# Patient Record
Sex: MALE | Race: WHITE | NOT HISPANIC OR LATINO | Employment: FULL TIME | URBAN - METROPOLITAN AREA
[De-identification: names, ages, dates, MRNs, and addresses within clinical notes are randomized per-mention and may not be internally consistent; named-entity substitution may affect disease eponyms.]

---

## 2017-04-17 ENCOUNTER — OFFICE VISIT - RIVER FALLS (OUTPATIENT)
Dept: FAMILY MEDICINE | Facility: CLINIC | Age: 42
End: 2017-04-17

## 2018-05-04 ENCOUNTER — OFFICE VISIT - RIVER FALLS (OUTPATIENT)
Dept: FAMILY MEDICINE | Facility: CLINIC | Age: 43
End: 2018-05-04

## 2018-05-04 ASSESSMENT — MIFFLIN-ST. JEOR: SCORE: 2001.63

## 2018-07-07 ENCOUNTER — OFFICE VISIT - RIVER FALLS (OUTPATIENT)
Dept: FAMILY MEDICINE | Facility: CLINIC | Age: 43
End: 2018-07-07

## 2022-02-11 VITALS
BODY MASS INDEX: 35.36 KG/M2 | HEIGHT: 70 IN | TEMPERATURE: 98.6 F | OXYGEN SATURATION: 96 % | SYSTOLIC BLOOD PRESSURE: 110 MMHG | WEIGHT: 247 LBS | DIASTOLIC BLOOD PRESSURE: 68 MMHG | HEART RATE: 77 BPM

## 2022-02-11 VITALS
TEMPERATURE: 97.8 F | BODY MASS INDEX: 34.12 KG/M2 | WEIGHT: 237.8 LBS | HEART RATE: 88 BPM | SYSTOLIC BLOOD PRESSURE: 152 MMHG | DIASTOLIC BLOOD PRESSURE: 82 MMHG

## 2022-02-12 VITALS
BODY MASS INDEX: 34.75 KG/M2 | WEIGHT: 242.2 LBS | DIASTOLIC BLOOD PRESSURE: 88 MMHG | SYSTOLIC BLOOD PRESSURE: 122 MMHG | TEMPERATURE: 97.3 F | HEART RATE: 80 BPM

## 2022-02-13 ENCOUNTER — APPOINTMENT (OUTPATIENT)
Dept: ULTRASOUND IMAGING | Facility: CLINIC | Age: 47
End: 2022-02-13
Attending: EMERGENCY MEDICINE
Payer: COMMERCIAL

## 2022-02-13 ENCOUNTER — APPOINTMENT (OUTPATIENT)
Dept: GENERAL RADIOLOGY | Facility: CLINIC | Age: 47
End: 2022-02-13
Attending: EMERGENCY MEDICINE
Payer: COMMERCIAL

## 2022-02-13 ENCOUNTER — HOSPITAL ENCOUNTER (EMERGENCY)
Facility: CLINIC | Age: 47
Discharge: HOME | End: 2022-02-14
Attending: EMERGENCY MEDICINE | Admitting: EMERGENCY MEDICINE
Payer: COMMERCIAL

## 2022-02-13 DIAGNOSIS — R07.9 CHEST PAIN, UNSPECIFIED TYPE: ICD-10-CM

## 2022-02-13 LAB
ALBUMIN SERPL-MCNC: 3.7 G/DL (ref 3.4–5)
ALP SERPL-CCNC: 92 U/L (ref 40–150)
ALT SERPL W P-5'-P-CCNC: 59 U/L (ref 0–70)
ANION GAP SERPL CALCULATED.3IONS-SCNC: 8 MMOL/L (ref 3–14)
AST SERPL W P-5'-P-CCNC: 43 U/L (ref 0–45)
BASOPHILS # BLD AUTO: 0.1 10E3/UL (ref 0–0.2)
BASOPHILS NFR BLD AUTO: 1 %
BILIRUB SERPL-MCNC: 0.3 MG/DL (ref 0.2–1.3)
BUN SERPL-MCNC: 13 MG/DL (ref 7–30)
CALCIUM SERPL-MCNC: 9.2 MG/DL (ref 8.5–10.1)
CHLORIDE BLD-SCNC: 102 MMOL/L (ref 94–109)
CO2 SERPL-SCNC: 26 MMOL/L (ref 20–32)
CREAT SERPL-MCNC: 0.8 MG/DL (ref 0.66–1.25)
EOSINOPHIL # BLD AUTO: 0.1 10E3/UL (ref 0–0.7)
EOSINOPHIL NFR BLD AUTO: 1 %
ERYTHROCYTE [DISTWIDTH] IN BLOOD BY AUTOMATED COUNT: 12.4 % (ref 10–15)
GFR SERPL CREATININE-BSD FRML MDRD: >90 ML/MIN/1.73M2
GLUCOSE BLD-MCNC: 174 MG/DL (ref 70–99)
HCT VFR BLD AUTO: 46.9 % (ref 40–53)
HGB BLD-MCNC: 16.2 G/DL (ref 13.3–17.7)
HOLD SPECIMEN: NORMAL
IMM GRANULOCYTES # BLD: 0.1 10E3/UL
IMM GRANULOCYTES NFR BLD: 1 %
LIPASE SERPL-CCNC: 128 U/L (ref 73–393)
LYMPHOCYTES # BLD AUTO: 2.8 10E3/UL (ref 0.8–5.3)
LYMPHOCYTES NFR BLD AUTO: 25 %
MCH RBC QN AUTO: 30.9 PG (ref 26.5–33)
MCHC RBC AUTO-ENTMCNC: 34.5 G/DL (ref 31.5–36.5)
MCV RBC AUTO: 90 FL (ref 78–100)
MONOCYTES # BLD AUTO: 0.6 10E3/UL (ref 0–1.3)
MONOCYTES NFR BLD AUTO: 6 %
NEUTROPHILS # BLD AUTO: 7.4 10E3/UL (ref 1.6–8.3)
NEUTROPHILS NFR BLD AUTO: 66 %
NRBC # BLD AUTO: 0 10E3/UL
NRBC BLD AUTO-RTO: 0 /100
PLATELET # BLD AUTO: 271 10E3/UL (ref 150–450)
POTASSIUM BLD-SCNC: 3.8 MMOL/L (ref 3.4–5.3)
PROT SERPL-MCNC: 8.1 G/DL (ref 6.8–8.8)
RBC # BLD AUTO: 5.24 10E6/UL (ref 4.4–5.9)
SODIUM SERPL-SCNC: 136 MMOL/L (ref 133–144)
TROPONIN I SERPL HS-MCNC: 6 NG/L
WBC # BLD AUTO: 11 10E3/UL (ref 4–11)

## 2022-02-13 PROCEDURE — 76705 ECHO EXAM OF ABDOMEN: CPT

## 2022-02-13 PROCEDURE — 36415 COLL VENOUS BLD VENIPUNCTURE: CPT | Performed by: EMERGENCY MEDICINE

## 2022-02-13 PROCEDURE — 82040 ASSAY OF SERUM ALBUMIN: CPT | Performed by: EMERGENCY MEDICINE

## 2022-02-13 PROCEDURE — 93005 ELECTROCARDIOGRAM TRACING: CPT

## 2022-02-13 PROCEDURE — 80053 COMPREHEN METABOLIC PANEL: CPT | Performed by: EMERGENCY MEDICINE

## 2022-02-13 PROCEDURE — 83690 ASSAY OF LIPASE: CPT | Performed by: EMERGENCY MEDICINE

## 2022-02-13 PROCEDURE — 71046 X-RAY EXAM CHEST 2 VIEWS: CPT

## 2022-02-13 PROCEDURE — 85025 COMPLETE CBC W/AUTO DIFF WBC: CPT | Performed by: EMERGENCY MEDICINE

## 2022-02-13 PROCEDURE — 99285 EMERGENCY DEPT VISIT HI MDM: CPT | Mod: 25

## 2022-02-13 PROCEDURE — 84484 ASSAY OF TROPONIN QUANT: CPT | Performed by: EMERGENCY MEDICINE

## 2022-02-13 ASSESSMENT — MIFFLIN-ST. JEOR: SCORE: 2092.35

## 2022-02-14 VITALS
WEIGHT: 260 LBS | OXYGEN SATURATION: 94 % | TEMPERATURE: 97.8 F | BODY MASS INDEX: 35.21 KG/M2 | HEART RATE: 90 BPM | DIASTOLIC BLOOD PRESSURE: 92 MMHG | HEIGHT: 72 IN | SYSTOLIC BLOOD PRESSURE: 146 MMHG | RESPIRATION RATE: 23 BRPM

## 2022-02-14 LAB
ATRIAL RATE - MUSE: 111 BPM
DIASTOLIC BLOOD PRESSURE - MUSE: NORMAL MMHG
INTERPRETATION ECG - MUSE: NORMAL
P AXIS - MUSE: 51 DEGREES
PR INTERVAL - MUSE: 134 MS
QRS DURATION - MUSE: 72 MS
QT - MUSE: 320 MS
QTC - MUSE: 435 MS
R AXIS - MUSE: 46 DEGREES
SYSTOLIC BLOOD PRESSURE - MUSE: NORMAL MMHG
T AXIS - MUSE: 99 DEGREES
VENTRICULAR RATE- MUSE: 111 BPM

## 2022-02-14 NOTE — ED PROVIDER NOTES
"  History   Chief Complaint:  Chest Pain       The history is provided by the patient.   History supplemented by electronic chart review     Lang Vasquez is a 46 year old male who presents with chest pain. The patient reports that he has been experiencing midsternal chest pain for \"a while.\" His pain is reportedly intermittent, with episodes lasting about half an hour. He states that eating is a trigger for his symptoms. The patient also notes that he has been coughing intermittently \"for weeks.\" Of note, the patient reports no history of gallbladder issues. He denies any use of antacids. He states that he drinks alcohol \"every once in a while,\" but not daily. He also notes that he has a cigarette \"every once in a while, but not full-time.\" At this time, he endorses no pain. He wonders whether he might have cancer. He would also like to make sure he is not having any heart issue. Symptoms are not exertional. No prior diagnosis of heart or lung problems. He does not take antacids. He told his coworkers about his symptoms tonight and encouraged him to come to the hospital to get checked out.    Review of Systems   All other systems reviewed and are negative.    Allergies:  No Known Drug Allergies     Medications:  The patient is currently on no regular medications.     Past Medical History:     The patient denies past medical history including gallbladder issues.       Past Surgical History:    The patient denies past surgical history.     Family History:    The patient denies past family history.     Social History:  Presents to the emergency department alone   Arrives via car   [place]    Physical Exam     Patient Vitals for the past 24 hrs:   BP Temp Temp src Pulse Resp SpO2 Height Weight   02/14/22 0020 (!) 146/92 -- -- 90 23 94 % -- --   02/13/22 2320 (!) 153/97 -- -- 98 21 94 % -- --   02/13/22 2208 (!) 166/101 97.8  F (36.6  C) Temporal 115 16 98 % 1.829 m (6') 117.9 kg (260 " lb)     Physical Exam  General: Nontoxic-appearing male sitting upright in room 1  HENT: mucous membranes moist  CV: rate as above, regular rhythm, no lower extremity edema, no JVD, palpable symmetric radial pulses  Resp: normal effort, speaks in full phrases, no stridor, no cough observed  GI: abdomen soft and nontender, no guarding, negative Barger's sign  MSK: no bony tenderness to chest  Skin: appropriately warm and dry, no erythema or vesicles to chest wall  Neuro: alert, clear speech, oriented  Psych: cooperative    Emergency Department Course   ECG  ECG obtained at 2203, ECG read at 2230  Sinus tachycardia  Possible Left atrial enlargement  Nonspecific ST and T wave abnormality   Rate 111 bpm. NM interval 134 ms. QRS duration 72 ms. QT/QTc 320/435 ms. P-R-T axes 51 46 99.     Imaging:  US Abdomen Limited   Final Result   IMPRESSION:   1.  No sonographic evidence of cholelithiasis or cholecystitis.   2.  Enlarged liver with steatosis.            XR Chest 2 Views   Final Result   IMPRESSION: No evidence of active cardiopulmonary disease.         Report per radiology    Laboratory:  Labs Ordered and Resulted from Time of ED Arrival to Time of ED Departure   COMPREHENSIVE METABOLIC PANEL - Abnormal       Result Value    Sodium 136      Potassium 3.8      Chloride 102      Carbon Dioxide (CO2) 26      Anion Gap 8      Urea Nitrogen 13      Creatinine 0.80      Calcium 9.2      Glucose 174 (*)     Alkaline Phosphatase 92      AST 43      ALT 59      Protein Total 8.1      Albumin 3.7      Bilirubin Total 0.3      GFR Estimate >90     TROPONIN I - Normal    Troponin I High Sensitivity 6     LIPASE - Normal    Lipase 128     CBC WITH PLATELETS AND DIFFERENTIAL    WBC Count 11.0      RBC Count 5.24      Hemoglobin 16.2      Hematocrit 46.9      MCV 90      MCH 30.9      MCHC 34.5      RDW 12.4      Platelet Count 271      % Neutrophils 66      % Lymphocytes 25      % Monocytes 6      % Eosinophils 1      % Basophils 1       % Immature Granulocytes 1      NRBCs per 100 WBC 0      Absolute Neutrophils 7.4      Absolute Lymphocytes 2.8      Absolute Monocytes 0.6      Absolute Eosinophils 0.1      Absolute Basophils 0.1      Absolute Immature Granulocytes 0.1      Absolute NRBCs 0.0          Emergency Department Course:     Reviewed:  I reviewed nursing notes and vitals    Assessments:  2230 I obtained history and examined the patient as noted above.     0055 I rechecked the patient and explained findings.     Interventions:  GI Cocktail PO    Disposition:  The patient was discharged to home.     Impression & Plan   Medical Decision Making:  The fact that his discomfort is provoked with eating does raise significant suspicion for an upper GI cause such as esophagitis, gastritis, reflux, but also considered biliary colic, pancreatitis, acute coronary syndrome, pancreatitis, aortic disease and others. His evaluation here is reassuring, without evidence of any immediate life-threatening process. I do not think he requires further cardiac testing at this time. We discussed the rationale for initiation of an antacid as well as follow-up through primary care, and he may ultimately benefit from GI evaluation if symptoms persist. Return for sudden worsening at any hour.    Diagnosis:    ICD-10-CM    1. Chest pain, unspecified type  R07.9        Discharge Medications:  New Prescriptions    OMEPRAZOLE (PRILOSEC) 20 MG DR CAPSULE    Take 1 capsule (20 mg) by mouth daily       Scribe Disclosure:  I, Leonardo Blair, am serving as a scribe at 10:29 PM on 2/13/2022 to document services personally performed by Vaibhav Abraham MD based on my observations and the provider's statements to me.   This note was completed in part using Dragon voice recognition software. Although reviewed after completion, some word and grammatical errors may occur.    This note was completed in part using Dragon voice recognition software. Although reviewed after  completion, some word and grammatical errors may occur.             Vaibhav Abraham MD  02/14/22 0257

## 2022-02-14 NOTE — ED TRIAGE NOTES
pt reports intermittent midsternal burning sensation in chest x1 month. pain worse after eating tonight while at work. denies sob.

## 2022-02-16 NOTE — PROGRESS NOTES
Chief Complaint      Patient is here for irritation to right eye. States he poked himself in the eye with a ruler yesterday. Has gotten worse over the night.      History of Present Illness      Chief complaint as above reviewed and confirmed with patient.  Pt presents to the clinic with concerns re: R eye pain.  He accidentally hit it with the metal edge of a ruler.  He has pain, photophobia, watering and redness. no fevers.  he is UTD on tetanus immunization. denies vision change.      Review of Systems      Review of systems is negative with the exception of those noted in HPI              Physical Exam         Vitals & Measurements        T: 97.3   F (Tympanic)  HR: 80(Peripheral)  BP: 122/88         WT: 242.2 lb       exam of the R eye reveals conjunctiva to be injected and watering.  mild conjunctival edema (pt reports rubbing a lot).  perrl, EOMI.  Proparacaine drop placed      flouracine dye placed.  there is increased uptake centrally about a 2 mm area. about 9 :00 position R lateral of the pupil       lid unremarkable.              Assessment/Plan      Corneal abrasion (S05.00XA)         polytrim, eye rest, dark room and sun glasses.  I would like to recheck in 2 days given location of abrasion. if this results in scaring  it is in field of vision.  Will check [facility] today and at time of recheck as well.  pt agreeable. if problems with resolving or recurrent ulceration will refer to opthamology.        Orders:      14124 office outpatient visit 15 minutes (Charge), Quantity: 1, Corneal abrasion             Orders:        polymyxin B-trimethoprim ophthalmic, 1 drop(s), Both eyes, q3hr, # 10 mL, 0 Refill(s), Type: Maintenance, Pharmacy: [id number], 1 drop(s) Eye-Both q3 hr (int),x10 day(s), (Ordered)        Return to Clinic (Request), RFV: Eye recheck per MELVI RT Date 07/09/18      Patient Information      Name:  INA TRAMMELL      Address:       [address] [place] [address]  [address]      Sex: Male      YOB: 1975      Phone: [phone]      Emergency Contact: MANUELITO VASQUEZ      MRN: [id number]      FIN: [id number]      Location: [facility]      Date of Service: 07/07/2018      Primary Care Physician:       Jovanni Rea MD, [phone]      Attending Physician:       Mya Cao PA-C, [phone]      Problem List/Past Medical History      Ongoing       Depression       Mild sleep apnea       Obesity      Historical        *Hospitalized@[facility] - Alcohol use disorder, unspecified depressive disorder      Procedure/Surgical History        Polysomnogram - HST (06/08/2016)           Comments:        AHI of 7.7 and RDI of 12.3      Medications        AutoCPAP +4/15 cm H2O: See Instructions, Heated humidifier, heated tubing, filters, nasal or full face mask of choice with headgear.  Replacement cushions and supplies as needed.  Months = 99 (Lifetime), 1 EA, 0 Refill(s).        Polytrim 10,000 units-1 mg/mL ophthalmic solution: 1 drop(s), Both eyes, q3hr, for 10 day(s), 10 mL, 0 Refill(s).                    Allergies      No known allergies      Social History       Smoking Status - 07/07/2018        Never smoker      Alcohol       Current, 3-5 times per week, 6 drinks/episode average., 07/15/2015      Employment and Education       Employed, Work/School description: ., 07/15/2015      Exercise and Physical Activity       Exercise frequency: 3-4 times/week., 07/15/2015      Home and Environment       Marital status: . Spouse/Partner name: Manuelito Vasquez., 07/15/2015      Nutrition and Health       Type of diet: Regular., 07/15/2015      Sexual       Sexually active: Yes. Sexual orientation: Heterosexual., 07/15/2015      Substance Abuse       Never, 07/15/2015      Tobacco       Never, 07/15/2015      Immunizations           Vaccine           Date           Status           Comments            tetanus/diphth/pertuss (Tdap) adult/adol          07/09/2011          Recorded           Td          01/01/2008          Recorded          [1/28/2011] Month and day unknown.  Signature Line  Signed and Authored by Mya Cao PA-C on 07/08/2018 10:33 PM CDT         Charted Date:      July 08, 2018 10:33 PM CDT      Subject / Title:      corneal abrasion      Performed By:      Mya Cao PA-C on July 08, 2018 10:33 PM CDT      Electronically Signed By:      Mya Cao PA-C on July 08, 2018 10:33 PM CDT      Visit Information:      [id number], [facility], Outpatient, 7/7/2018 - 7/7/2018

## 2022-02-16 NOTE — PROGRESS NOTES
Patient:   INA TRAMMELL            MRN: [id number]            FIN: [id number]               Age:   41 years     Sex:  Male     :  1975   Associated Diagnoses:   Mild sleep apnea   Author:   Jovanni Rea MD      Chief Complaint   2017 4:26 PM CDT    f/u sleep study. Pt did not follow through with CPAP order.      History of Present Illness   see chief complaint as noted above and confirmed with the patient     41 year old male here today to discuss c-pap machine. He was seen  for follow up with sleep study result's, he did get an order put in for a c-pap but at the time he did not get set up for it, he is willing to try a c-pap machine and feels it will help him to get restorative sleep and feel alert and awake throughout the day. He had his original home sleep study done on 2016, he did this test due to daytime sleepiness, witnessed apneas, and waking up often throughout the night. His AHI was 7.7 with RDI of 12.3 with marked daytime sleepiness, mild sleep apnea.       Review of Systems   Constitutional:  Fatigue, Decreased activity.    Ear/Nose/Mouth/Throat:  No nasal congestion.    Respiratory:  No shortness of breath, No cough.    Gastrointestinal:  No nausea, No vomiting.    Genitourinary:  No dysuria.    Musculoskeletal:  No trauma.    Integumentary:  No rash.             Health Status   Allergies:    Allergic Reactions (Selected)  No known allergies   Medications:  (Selected)   Prescriptions  Prescribed  AutoCPAP +4/15 cm H2O: AutoCPAP +4/15 cm H2O, See Instructions, Instructions: Heated humidifier, heated tubing, filters, nasal or full face mask of choice with headgear.  Replacement cushions and supplies as needed.  Months = 99 (Lifetime), Supply, # 1 EA, 0 Refill(s), Type...  buPROPion 150 mg/24 hours (XL) oral tablet, extended release: 1 tab(s) ( 150 mg ), po, q 24 hrs, # 90 tab(s), 1 Refill(s), Type: Maintenance, Pharmacy: [id number], 1 tab(s) po q 24 hrs    Problem list:    All Problems  Obesity / SNOMED CT 2809778563 / Probable  Depression / SNOMED CT 725062047 / Confirmed  Mild sleep apnea / SNOMED CT 126966530 / Confirmed  Resolved: *Hospitalized@[facility] - Alcohol use disorder, unspecified depressive disorder  Canceled: Ear Pain / ICD-9-.70      Histories   Past Medical History:    Resolved  *Hospitalized@[facility] - Alcohol use disorder, unspecified depressive disorder: Onset on 4/16/2016 at 40 years.  Resolved on 5/10/2016 at 40 years.   Family History:    No family history items have been selected or recorded.   Procedure history:    Polysomnogram - HST on 6/8/2016 at 40 Years.  Comments:  7/6/2016 1:49 PM - Ashley Quarles CMA  AHI of 7.7 and RDI of 12.3   Social History:        Alcohol Assessment            Current, 3-5 times per week, 6 drinks/episode average.      Tobacco Assessment            Never      Substance Abuse Assessment            Never      Employment and Education Assessment            Employed, Work/School description: .      Home and Environment Assessment            Marital status: .  Spouse/Partner name: Ann Vsaquez.      Nutrition and Health Assessment            Type of diet: Regular.      Exercise and Physical Activity Assessment            Exercise frequency: 3-4 times/week.      Sexual Assessment            Sexually active: Yes.  Sexual orientation: Heterosexual.        Physical Examination   Vital Signs   4/17/2017 4:26 PM CDT Temperature Tympanic 97.8 DegF  LOW    Peripheral Pulse Rate 88 bpm    Pulse Site Radial artery    HR Method Manual    Systolic Blood Pressure 152 mmHg  HI    Diastolic Blood Pressure 82 mmHg    Mean Arterial Pressure 105 mmHg    BP Site Right arm    BP Method Manual      Measurements from flowsheet : Measurements   4/17/2017 4:26 PM CDT    Weight Measured - Standard                237.8 lb     General:  Alert and oriented, No acute distress.    Eye:  Pupils are equal,  round and reactive to light, Normal conjunctiva.    HENT:  Oral mucosa is moist.    Neck:  Supple, No lymphadenopathy.    Respiratory:  Respirations are non-labored.    Cardiovascular:  Normal rate, Regular rhythm, No edema.    Musculoskeletal:  Normal gait.    Integumentary:  Warm, No rash.    Neurologic:  Alert, Oriented.    Psychiatric:  Cooperative, Appropriate mood & affect, Normal judgment.       Review / Management   Results review      Impression and Plan       Diagnosis     Mild sleep apnea (SJV14-PK G47.30).     Course:  Progressing as expected, reviewed what stopped him before from proceeding and what his motivation is now  reviewed his study finding and its significance  reviewed overall effects of MT as well as goals of therapy and obstacles to therapy  seems motivated to use  continue download and follow up.    Plan:  Order for c-pap in chart     Set up with [facility] who will give you a machine with mask and supplies    Follow up in two months to see effectivness of therapy.     Please return sooner if needed. .    Chaya MCARTHUR Medical Assistant acted solely as a scribe for, and in presence of Dr. Jovanni Rea who performed the services.

## 2022-02-16 NOTE — PROGRESS NOTES
Chief Complaint    Pt c/o cough for 1 week.  History of Present Illness      Chief complaint as above reviewed and confirmed with patient.  Pt presents to the clinic with concerns re: cough. cough present about 1 week but seems to be worsening.  has had minimal rhinorrhea, congestion, bothered by cough the most, it is deep productive, with yellow to green sputum.  no chest pain, no wheezing or sob.  he is not currently using tobacco but has a hx of tobacco use.  no nausea, vomiting. cough keeping him up at night and bothersome day time as well.  no exposures known.  he feels sx worsening rather than improving.  He is having a hard time using his CPAP due to the cough at night particularity.  Review of Systems      Review of systems is negative with the exception of those noted in HPI          Physical Exam   Vitals & Measurements    T: 98.6   F (Tympanic)  HR: 77(Peripheral)  BP: 110/68  SpO2: 96%     HT: 70 in  WT: 247 lb  BMI: 35.44           Vitals as above per nursing documentation           Constitutional : nad appears well          Ears: ears patent B, TMS intact, noninjected           Nose: nasal mucosa is non-ededmatous. no discharge           Throat: pharynx is nonerythematous, no tonsillar hypertrophy, no exudate           Neck: neck supple, no adenopathy, no thyromegaly, no rigidity           Lungs: few scattered rhonchi throughout, no Wheezes, or rales           Heart: heart RRR, nl S1, S2 no murmur           skin:  No rashes              Assessment/Plan       1. Bronchitis (J40)         give duration and worsening sx will cover with zithromax as ordered. tessalon as ordered for cough.  Pt instructed to return to clinic for persistent or worsening symptoms.                    Orders:          19522 office outpatient visit 15 minutes (Charge), Quantity: 1, Bronchitis                Orders:         azithromycin, Take 2 tablets on Day 1 and then 1 tablet, PO, Daily, Instructions: until finished, # 6 tab(s),  0 Refill(s), Type: Maintenance, Pharmacy: [id number], Take 2 tablets on Day 1 and then 1 tablet po daily,Instr:until finished, (Ordered)         benzonatate, See Instructions, Instructions: 1-2 cap(s) PO TID 7 day(s), # 42 EA, 0 Refill(s), Type: Maintenance, Pharmacy: [id number], 1-2 cap(s) PO TID 7 day(s), (Ordered)  Patient Information     Name:INA TRAMMELL      Address:      [address] [place] [id number]     Sex:Male     YOB: 1975     [phone]     MRN:[id number]     FIN:[id number]     Location:[facility]     Date of Service:05/04/2018      Primary Care Physician:       Jovanni Rea MD, [phone]      Attending Physician:       Mya Cao PA-C, [phone]  Problem List/Past Medical History    Ongoing     Depression     Mild sleep apnea     Obesity    Historical     *Hospitalized@[facility] - Alcohol use disorder, unspecified depressive disorder  Procedure/Surgical History     Polysomnogram - HST (06/08/2016)             Comments: AHI of 7.7 and RDI of 12.3  Medications     AutoCPAP +4/15 cm H2O: See Instructions, Heated humidifier, heated tubing, filters, nasal or full face mask of choice with headgear.  Replacement cushions and supplies as needed.  Months = 99 (Lifetime), 1 EA, 0 Refill(s).     Zithromax Z-Alexys 250 mg oral tablet: Take 2 tablets on Day 1 and then 1 tablet, PO, Daily, until finished, 6 tab(s), 0 Refill(s).     Tessalon Perles 100 mg oral capsule: See Instructions, 1-2 cap(s) PO TID 7 day(s), 42 EA, 0 Refill(s).          Allergies    No known allergies  Social History    Smoking Status - 05/04/2018     Former smoker     Alcohol      Current, 3-5 times per week, 6 drinks/episode average., 07/15/2015     Employment and Education      Employed, Work/School description: ., 07/15/2015     Exercise and Physical Activity      Exercise frequency: 3-4 times/week., 07/15/2015      Home and Environment      Marital status: . Spouse/Partner name: Ann aVsquez., 07/15/2015     Nutrition and Health      Type of diet: Regular., 07/15/2015     Sexual      Sexually active: Yes. Sexual orientation: Heterosexual., 07/15/2015     Substance Abuse      Never, 07/15/2015     Tobacco      Never, 07/15/2015  Immunizations      Vaccine Date Status Comments      tetanus/diphth/pertuss (Tdap) adult/adol 07/09/2011 Recorded      Td 01/01/2008 Recorded [1/28/2011] Month and day unknown.